# Patient Record
Sex: MALE | Race: BLACK OR AFRICAN AMERICAN | NOT HISPANIC OR LATINO | Employment: UNEMPLOYED | URBAN - NONMETROPOLITAN AREA
[De-identification: names, ages, dates, MRNs, and addresses within clinical notes are randomized per-mention and may not be internally consistent; named-entity substitution may affect disease eponyms.]

---

## 2023-08-28 ENCOUNTER — HOSPITAL ENCOUNTER (EMERGENCY)
Facility: HOSPITAL | Age: 63
Discharge: HOME | End: 2023-08-28
Attending: STUDENT IN AN ORGANIZED HEALTH CARE EDUCATION/TRAINING PROGRAM | Admitting: STUDENT IN AN ORGANIZED HEALTH CARE EDUCATION/TRAINING PROGRAM
Payer: MEDICARE

## 2023-08-28 ENCOUNTER — APPOINTMENT (OUTPATIENT)
Dept: CT IMAGING | Facility: HOSPITAL | Age: 63
End: 2023-08-28
Payer: MEDICARE

## 2023-08-28 VITALS
SYSTOLIC BLOOD PRESSURE: 104 MMHG | HEART RATE: 84 BPM | WEIGHT: 145 LBS | BODY MASS INDEX: 22.76 KG/M2 | TEMPERATURE: 99.8 F | HEIGHT: 67 IN | RESPIRATION RATE: 18 BRPM | OXYGEN SATURATION: 98 % | DIASTOLIC BLOOD PRESSURE: 59 MMHG

## 2023-08-28 DIAGNOSIS — J18.9 PNEUMONIA DUE TO INFECTIOUS ORGANISM, UNSPECIFIED LATERALITY, UNSPECIFIED PART OF LUNG: Primary | ICD-10-CM

## 2023-08-28 LAB
ABO GROUP BLD: NORMAL
ABO GROUP BLD: NORMAL
ALBUMIN SERPL-MCNC: 3.1 G/DL (ref 3.5–5.2)
ALBUMIN/GLOB SERPL: 0.6 G/DL
ALP SERPL-CCNC: 127 U/L (ref 39–117)
ALT SERPL W P-5'-P-CCNC: 33 U/L (ref 1–41)
ANION GAP SERPL CALCULATED.3IONS-SCNC: 13.5 MMOL/L (ref 5–15)
AST SERPL-CCNC: 29 U/L (ref 1–40)
BACTERIA UR QL AUTO: ABNORMAL /HPF
BILIRUB SERPL-MCNC: 0.6 MG/DL (ref 0–1.2)
BILIRUB UR QL STRIP: ABNORMAL
BLD GP AB SCN SERPL QL: NEGATIVE
BUN SERPL-MCNC: 7 MG/DL (ref 8–23)
BUN/CREAT SERPL: 9.9 (ref 7–25)
CALCIUM SPEC-SCNC: 8.4 MG/DL (ref 8.6–10.5)
CHLORIDE SERPL-SCNC: 90 MMOL/L (ref 98–107)
CLARITY UR: CLEAR
CO2 SERPL-SCNC: 24.5 MMOL/L (ref 22–29)
COLOR UR: ABNORMAL
CREAT SERPL-MCNC: 0.71 MG/DL (ref 0.76–1.27)
CRP SERPL-MCNC: 27.76 MG/DL (ref 0–0.5)
D-LACTATE SERPL-SCNC: 1 MMOL/L (ref 0.5–2)
DEPRECATED RDW RBC AUTO: 43.7 FL (ref 37–54)
EGFRCR SERPLBLD CKD-EPI 2021: 105 ML/MIN/1.73
ERYTHROCYTE [DISTWIDTH] IN BLOOD BY AUTOMATED COUNT: 14.6 % (ref 12.3–15.4)
ERYTHROCYTE [SEDIMENTATION RATE] IN BLOOD: >130 MM/HR (ref 0–20)
FLUAV RNA RESP QL NAA+PROBE: NOT DETECTED
FLUBV RNA RESP QL NAA+PROBE: NOT DETECTED
GLOBULIN UR ELPH-MCNC: 4.9 GM/DL
GLUCOSE SERPL-MCNC: 100 MG/DL (ref 65–99)
GLUCOSE UR STRIP-MCNC: NEGATIVE MG/DL
HCT VFR BLD AUTO: 40 % (ref 37.5–51)
HETEROPH AB SER QL LA: NEGATIVE
HGB BLD-MCNC: 13.3 G/DL (ref 13–17.7)
HGB UR QL STRIP.AUTO: ABNORMAL
HOLD SPECIMEN: NORMAL
HOLD SPECIMEN: NORMAL
HYALINE CASTS UR QL AUTO: ABNORMAL /LPF
KETONES UR QL STRIP: ABNORMAL
LEUKOCYTE ESTERASE UR QL STRIP.AUTO: ABNORMAL
LYMPHOCYTES # BLD MANUAL: 2.17 10*3/MM3 (ref 0.7–3.1)
LYMPHOCYTES NFR BLD MANUAL: 23 % (ref 5–12)
MAGNESIUM SERPL-MCNC: 1.7 MG/DL (ref 1.6–2.4)
MCH RBC QN AUTO: 27.4 PG (ref 26.6–33)
MCHC RBC AUTO-ENTMCNC: 33.3 G/DL (ref 31.5–35.7)
MCV RBC AUTO: 82.3 FL (ref 79–97)
METAMYELOCYTES NFR BLD MANUAL: 2 % (ref 0–0)
MONOCYTES # BLD: 3.33 10*3/MM3 (ref 0.1–0.9)
NEUTROPHILS # BLD AUTO: 8.69 10*3/MM3 (ref 1.7–7)
NEUTROPHILS NFR BLD MANUAL: 57 % (ref 42.7–76)
NEUTS BAND NFR BLD MANUAL: 3 % (ref 0–5)
NITRITE UR QL STRIP: NEGATIVE
PH UR STRIP.AUTO: 8.5 [PH] (ref 5–8)
PLAT MORPH BLD: NORMAL
PLATELET # BLD AUTO: 290 10*3/MM3 (ref 140–450)
PMV BLD AUTO: 9.4 FL (ref 6–12)
POTASSIUM SERPL-SCNC: 3 MMOL/L (ref 3.5–5.2)
PROT SERPL-MCNC: 8 G/DL (ref 6–8.5)
PROT UR QL STRIP: ABNORMAL
RBC # BLD AUTO: 4.86 10*6/MM3 (ref 4.14–5.8)
RBC # UR STRIP: ABNORMAL /HPF
RBC MORPH BLD: NORMAL
REF LAB TEST METHOD: ABNORMAL
RH BLD: POSITIVE
RH BLD: POSITIVE
SARS-COV-2 RNA RESP QL NAA+PROBE: NOT DETECTED
SODIUM SERPL-SCNC: 128 MMOL/L (ref 136–145)
SP GR UR STRIP: 1.02 (ref 1–1.03)
SQUAMOUS #/AREA URNS HPF: ABNORMAL /HPF
T&S EXPIRATION DATE: NORMAL
UROBILINOGEN UR QL STRIP: ABNORMAL
VARIANT LYMPHS NFR BLD MANUAL: 15 % (ref 19.6–45.3)
WBC # UR STRIP: ABNORMAL /HPF
WBC NRBC COR # BLD: 14.48 10*3/MM3 (ref 3.4–10.8)
WHOLE BLOOD HOLD COAG: NORMAL
WHOLE BLOOD HOLD SPECIMEN: NORMAL

## 2023-08-28 PROCEDURE — 96365 THER/PROPH/DIAG IV INF INIT: CPT

## 2023-08-28 PROCEDURE — 83735 ASSAY OF MAGNESIUM: CPT | Performed by: PHYSICIAN ASSISTANT

## 2023-08-28 PROCEDURE — 85025 COMPLETE CBC W/AUTO DIFF WBC: CPT | Performed by: PHYSICIAN ASSISTANT

## 2023-08-28 PROCEDURE — 86140 C-REACTIVE PROTEIN: CPT | Performed by: PHYSICIAN ASSISTANT

## 2023-08-28 PROCEDURE — 80053 COMPREHEN METABOLIC PANEL: CPT | Performed by: PHYSICIAN ASSISTANT

## 2023-08-28 PROCEDURE — 86900 BLOOD TYPING SEROLOGIC ABO: CPT

## 2023-08-28 PROCEDURE — 86850 RBC ANTIBODY SCREEN: CPT | Performed by: PHYSICIAN ASSISTANT

## 2023-08-28 PROCEDURE — 36415 COLL VENOUS BLD VENIPUNCTURE: CPT

## 2023-08-28 PROCEDURE — 86900 BLOOD TYPING SEROLOGIC ABO: CPT | Performed by: PHYSICIAN ASSISTANT

## 2023-08-28 PROCEDURE — 85007 BL SMEAR W/DIFF WBC COUNT: CPT | Performed by: PHYSICIAN ASSISTANT

## 2023-08-28 PROCEDURE — 71250 CT THORAX DX C-: CPT

## 2023-08-28 PROCEDURE — 87040 BLOOD CULTURE FOR BACTERIA: CPT | Performed by: PHYSICIAN ASSISTANT

## 2023-08-28 PROCEDURE — 86308 HETEROPHILE ANTIBODY SCREEN: CPT | Performed by: STUDENT IN AN ORGANIZED HEALTH CARE EDUCATION/TRAINING PROGRAM

## 2023-08-28 PROCEDURE — 85652 RBC SED RATE AUTOMATED: CPT | Performed by: PHYSICIAN ASSISTANT

## 2023-08-28 PROCEDURE — 87636 SARSCOV2 & INF A&B AMP PRB: CPT | Performed by: PHYSICIAN ASSISTANT

## 2023-08-28 PROCEDURE — 25010000002 HYDROMORPHONE 1 MG/ML SOLUTION: Performed by: STUDENT IN AN ORGANIZED HEALTH CARE EDUCATION/TRAINING PROGRAM

## 2023-08-28 PROCEDURE — 81001 URINALYSIS AUTO W/SCOPE: CPT | Performed by: PHYSICIAN ASSISTANT

## 2023-08-28 PROCEDURE — 25010000002 ONDANSETRON PER 1 MG: Performed by: STUDENT IN AN ORGANIZED HEALTH CARE EDUCATION/TRAINING PROGRAM

## 2023-08-28 PROCEDURE — 71250 CT THORAX DX C-: CPT | Performed by: RADIOLOGY

## 2023-08-28 PROCEDURE — 86901 BLOOD TYPING SEROLOGIC RH(D): CPT | Performed by: PHYSICIAN ASSISTANT

## 2023-08-28 PROCEDURE — 83605 ASSAY OF LACTIC ACID: CPT | Performed by: PHYSICIAN ASSISTANT

## 2023-08-28 PROCEDURE — 25510000001 IOPAMIDOL 61 % SOLUTION: Performed by: STUDENT IN AN ORGANIZED HEALTH CARE EDUCATION/TRAINING PROGRAM

## 2023-08-28 PROCEDURE — 74177 CT ABD & PELVIS W/CONTRAST: CPT | Performed by: RADIOLOGY

## 2023-08-28 PROCEDURE — 96375 TX/PRO/DX INJ NEW DRUG ADDON: CPT

## 2023-08-28 PROCEDURE — 74177 CT ABD & PELVIS W/CONTRAST: CPT

## 2023-08-28 PROCEDURE — 25010000002 MORPHINE PER 10 MG: Performed by: STUDENT IN AN ORGANIZED HEALTH CARE EDUCATION/TRAINING PROGRAM

## 2023-08-28 PROCEDURE — 25010000002 PIPERACILLIN SOD-TAZOBACTAM PER 1 G: Performed by: PHYSICIAN ASSISTANT

## 2023-08-28 PROCEDURE — 86901 BLOOD TYPING SEROLOGIC RH(D): CPT

## 2023-08-28 PROCEDURE — 99285 EMERGENCY DEPT VISIT HI MDM: CPT

## 2023-08-28 RX ORDER — PREDNISONE 20 MG/1
20 TABLET ORAL DAILY
Qty: 5 TABLET | Refills: 0 | Status: SHIPPED | OUTPATIENT
Start: 2023-08-28 | End: 2023-09-02

## 2023-08-28 RX ORDER — AMOXICILLIN AND CLAVULANATE POTASSIUM 875; 125 MG/1; MG/1
1 TABLET, FILM COATED ORAL EVERY 12 HOURS
Qty: 14 TABLET | Refills: 0 | Status: SHIPPED | OUTPATIENT
Start: 2023-08-28 | End: 2023-09-04

## 2023-08-28 RX ORDER — DOXYCYCLINE HYCLATE 100 MG/1
100 CAPSULE ORAL 2 TIMES DAILY
Qty: 14 CAPSULE | Refills: 0 | Status: SHIPPED | OUTPATIENT
Start: 2023-08-28 | End: 2023-09-04

## 2023-08-28 RX ORDER — ONDANSETRON 2 MG/ML
4 INJECTION INTRAMUSCULAR; INTRAVENOUS ONCE
Status: COMPLETED | OUTPATIENT
Start: 2023-08-28 | End: 2023-08-28

## 2023-08-28 RX ADMIN — HYDROMORPHONE HYDROCHLORIDE 1 MG: 1 INJECTION, SOLUTION INTRAMUSCULAR; INTRAVENOUS; SUBCUTANEOUS at 18:04

## 2023-08-28 RX ADMIN — IOPAMIDOL 100 ML: 612 INJECTION, SOLUTION INTRAVENOUS at 17:50

## 2023-08-28 RX ADMIN — SODIUM CHLORIDE 1000 ML: 9 INJECTION, SOLUTION INTRAVENOUS at 18:03

## 2023-08-28 RX ADMIN — PIPERACILLIN SODIUM AND TAZOBACTAM SODIUM 3.38 G: 3; .375 INJECTION, POWDER, LYOPHILIZED, FOR SOLUTION INTRAVENOUS at 18:07

## 2023-08-28 RX ADMIN — ONDANSETRON 4 MG: 2 INJECTION INTRAMUSCULAR; INTRAVENOUS at 16:45

## 2023-08-28 RX ADMIN — MORPHINE SULFATE 4 MG: 4 INJECTION, SOLUTION INTRAMUSCULAR; INTRAVENOUS at 16:46

## 2023-08-28 NOTE — ED PROVIDER NOTES
Subjective   History of Present Illness  62-year-old male presents secondary to right-sided abdominal pain predominantly in right lower quadrant.  Patient states this started first and then subsequently developed nausea vomiting and diarrhea.  He states he has had symptoms since last Wednesday.  His appetite has been poor.  He is progressively been getting weaker and feels lightheaded.  He states that his diarrhea looks dark.  He denies any bright red blood per rectum.  No vomiting up of blood.  He has no past medical problems.  He presents by private vehicle.    Review of Systems   Constitutional: Negative.  Negative for fever.   HENT: Negative.     Respiratory: Negative.     Cardiovascular: Negative.  Negative for chest pain.   Gastrointestinal:  Positive for abdominal pain, diarrhea, nausea and vomiting.   Endocrine: Negative.    Genitourinary: Negative.  Negative for dysuria.   Skin: Negative.    Neurological: Negative.    Psychiatric/Behavioral: Negative.     All other systems reviewed and are negative.    No past medical history on file.    No Known Allergies    No past surgical history on file.    No family history on file.    Social History     Socioeconomic History    Marital status: Single           Objective   Physical Exam  Vitals and nursing note reviewed.   Constitutional:       General: He is not in acute distress.     Appearance: He is well-developed. He is not diaphoretic.   HENT:      Head: Normocephalic and atraumatic.      Right Ear: External ear normal.      Left Ear: External ear normal.      Nose: Nose normal.   Eyes:      Conjunctiva/sclera: Conjunctivae normal.      Pupils: Pupils are equal, round, and reactive to light.   Neck:      Vascular: No JVD.      Trachea: No tracheal deviation.   Cardiovascular:      Rate and Rhythm: Normal rate and regular rhythm.      Heart sounds: Normal heart sounds. No murmur heard.  Pulmonary:      Effort: Pulmonary effort is normal. No respiratory distress.       Breath sounds: Normal breath sounds. No wheezing.   Abdominal:      General: Bowel sounds are normal.      Palpations: Abdomen is soft.      Tenderness: There is no abdominal tenderness.   Musculoskeletal:         General: No deformity. Normal range of motion.      Cervical back: Normal range of motion and neck supple.   Skin:     General: Skin is warm and dry.      Coloration: Skin is not pale.      Findings: No erythema or rash.   Neurological:      Mental Status: He is alert and oriented to person, place, and time.      Cranial Nerves: No cranial nerve deficit.   Psychiatric:         Behavior: Behavior normal.         Thought Content: Thought content normal.       Procedures           ED Course           Results for orders placed or performed during the hospital encounter of 08/28/23   COVID-19 and FLU A/B PCR - Swab, Nasopharynx    Specimen: Nasopharynx; Swab   Result Value Ref Range    COVID19 Not Detected Not Detected - Ref. Range    Influenza A PCR Not Detected Not Detected    Influenza B PCR Not Detected Not Detected   Comprehensive Metabolic Panel    Specimen: Arm, Left; Blood   Result Value Ref Range    Glucose 100 (H) 65 - 99 mg/dL    BUN 7 (L) 8 - 23 mg/dL    Creatinine 0.71 (L) 0.76 - 1.27 mg/dL    Sodium 128 (L) 136 - 145 mmol/L    Potassium 3.0 (L) 3.5 - 5.2 mmol/L    Chloride 90 (L) 98 - 107 mmol/L    CO2 24.5 22.0 - 29.0 mmol/L    Calcium 8.4 (L) 8.6 - 10.5 mg/dL    Total Protein 8.0 6.0 - 8.5 g/dL    Albumin 3.1 (L) 3.5 - 5.2 g/dL    ALT (SGPT) 33 1 - 41 U/L    AST (SGOT) 29 1 - 40 U/L    Alkaline Phosphatase 127 (H) 39 - 117 U/L    Total Bilirubin 0.6 0.0 - 1.2 mg/dL    Globulin 4.9 gm/dL    A/G Ratio 0.6 g/dL    BUN/Creatinine Ratio 9.9 7.0 - 25.0    Anion Gap 13.5 5.0 - 15.0 mmol/L    eGFR 105.0 >60.0 mL/min/1.73   Urinalysis With Culture If Indicated - Urine, Clean Catch    Specimen: Urine, Clean Catch   Result Value Ref Range    Color, UA Dark Yellow (A) Yellow, Straw    Appearance, UA  Clear Clear    pH, UA 8.5 (H) 5.0 - 8.0    Specific Gravity, UA 1.023 1.005 - 1.030    Glucose, UA Negative Negative    Ketones, UA Trace (A) Negative    Bilirubin, UA Small (1+) (A) Negative    Blood, UA Large (3+) (A) Negative    Protein, UA >=300 mg/dL (3+) (A) Negative    Leuk Esterase, UA Small (1+) (A) Negative    Nitrite, UA Negative Negative    Urobilinogen, UA 4.0 E.U./dL (A) 0.2 - 1.0 E.U./dL   C-reactive Protein    Specimen: Arm, Left; Blood   Result Value Ref Range    C-Reactive Protein 27.76 (H) 0.00 - 0.50 mg/dL   Sedimentation Rate    Specimen: Arm, Left; Blood   Result Value Ref Range    Sed Rate >130 (H) 0 - 20 mm/hr   CBC Auto Differential    Specimen: Arm, Left; Blood   Result Value Ref Range    WBC 14.48 (H) 3.40 - 10.80 10*3/mm3    RBC 4.86 4.14 - 5.80 10*6/mm3    Hemoglobin 13.3 13.0 - 17.7 g/dL    Hematocrit 40.0 37.5 - 51.0 %    MCV 82.3 79.0 - 97.0 fL    MCH 27.4 26.6 - 33.0 pg    MCHC 33.3 31.5 - 35.7 g/dL    RDW 14.6 12.3 - 15.4 %    RDW-SD 43.7 37.0 - 54.0 fl    MPV 9.4 6.0 - 12.0 fL    Platelets 290 140 - 450 10*3/mm3   Urinalysis, Microscopic Only - Urine, Clean Catch    Specimen: Urine, Clean Catch   Result Value Ref Range    RBC, UA 13-20 (A) None Seen, 0-2 /HPF    WBC, UA 0-2 None Seen, 0-2 /HPF    Bacteria, UA None Seen None Seen /HPF    Squamous Epithelial Cells, UA 0-2 None Seen, 0-2 /HPF    Hyaline Casts, UA 3-6 None Seen /LPF    Methodology Manual Light Microscopy    Manual Differential    Specimen: Arm, Left; Blood   Result Value Ref Range    Neutrophil % 57.0 42.7 - 76.0 %    Lymphocyte % 15.0 (L) 19.6 - 45.3 %    Monocyte % 23.0 (H) 5.0 - 12.0 %    Bands %  3.0 0.0 - 5.0 %    Metamyelocyte % 2.0 (H) 0.0 - 0.0 %    Neutrophils Absolute 8.69 (H) 1.70 - 7.00 10*3/mm3    Lymphocytes Absolute 2.17 0.70 - 3.10 10*3/mm3    Monocytes Absolute 3.33 (H) 0.10 - 0.90 10*3/mm3    RBC Morphology Normal Normal    Platelet Morphology Normal Normal   Magnesium    Specimen: Arm, Left; Blood    Result Value Ref Range    Magnesium 1.7 1.6 - 2.4 mg/dL   Lactic Acid, Plasma    Specimen: Blood   Result Value Ref Range    Lactate 1.0 0.5 - 2.0 mmol/L   Mononucleosis Screen    Specimen: Arm, Left; Blood   Result Value Ref Range    Monospot Negative Negative   Type & Screen    Specimen: Arm, Left; Blood   Result Value Ref Range    ABO Type O     RH type Positive     Antibody Screen Negative     T&S Expiration Date 8/31/2023 11:59:59 PM    ABO RH Specimen Verification    Specimen: Blood   Result Value Ref Range    ABO Type O     RH type Positive    Green Top (Gel)   Result Value Ref Range    Extra Tube Hold for add-ons.    Lavender Top   Result Value Ref Range    Extra Tube hold for add-on    Gold Top - SST   Result Value Ref Range    Extra Tube Hold for add-ons.    Light Blue Top   Result Value Ref Range    Extra Tube Hold for add-ons.                              Electronically signed by MAICOL Robertson, 08/28/23, 7:43 PM EDT.            Medical Decision Making  62-year-old male presents secondary to right-sided abdominal pain predominantly in right lower quadrant.  Patient states this started first and then subsequently developed nausea vomiting and diarrhea.  He states he has had symptoms since last Wednesday.  His appetite has been poor.  He is progressively been getting weaker and feels lightheaded.  He states that his diarrhea looks dark.  He denies any bright red blood per rectum.  No vomiting up of blood.  He has no past medical problems.  He presents by private vehicle.    -----  Handoff received.  Right lower quadrant pain is likely due to pneumonia noted on imaging.  Will DC with p.o. antibiotics, short course of steroids, follow-up with PCP    Problems Addressed:  Pneumonia due to infectious organism, unspecified laterality, unspecified part of lung: complicated acute illness or injury    Amount and/or Complexity of Data Reviewed  Labs: ordered. Decision-making details documented in ED  Course.  Radiology: ordered. Decision-making details documented in ED Course.    Risk  Prescription drug management.        Final diagnoses:   Pneumonia due to infectious organism, unspecified laterality, unspecified part of lung       ED Disposition  ED Disposition       ED Disposition   Discharge    Condition   Stable    Comment   --               Provider, No Known  [facility]  [place] [address]  [phone]    In 1 week           Medication List        New Prescriptions      amoxicillin-clavulanate 875-125 MG per tablet  Commonly known as: AUGMENTIN  Take 1 tablet by mouth Every 12 (Twelve) Hours for 7 days.     doxycycline 100 MG capsule  Commonly known as: VIBRAMYCIN  Take 1 capsule by mouth 2 (Two) Times a Day for 7 days.     predniSONE 20 MG tablet  Commonly known as: DELTASONE  Take 1 tablet by mouth Daily for 5 days.               Where to Get Your Medications        [facility] [address] - [phone]  - [phone]   [address], [facility] [address]      Phone: [phone]   amoxicillin-clavulanate 875-125 MG per tablet  doxycycline 100 MG capsule  predniSONE 20 MG tablet            Cas Gutierrez,   08/28/23 [id number]

## 2023-08-29 NOTE — DISCHARGE INSTRUCTIONS
Call one of the offices below to establish a primary care provider.  If you are unable to get an appointment and feel it is an emergency and need to be seen immediately please return to the Emergency Department.    Call one of the office below to set up a primary care provider.    Dr. Evan Tello                                                                                                       [address] [facility] [address]  [phone]    Dr. Mckeon, Dr. ISABEL Tyler, Dr. DEMETRIA Tyler ([facility])  [address] [facility] [address]  [phone]    Dr. Spaulding, Dr. Juan, Dr. Art ([facility])  [address] [facility] [address]  [phone]    Dr. Krause  [address] [facility] [address]  [phone]    Dr. Brice, Dr. Hughes, Dr. Davis, Dr. Casiano ([facility])  [address] [place] [address] [address]  [facility] [address]  [phone]    Dr. Crystal Yi  [address] [facility] [place] [address]  [phone]    Dr. Katerin Villa  [address]   [address]  [facility] [address]  [phone]    Dr. Tello  [address] [facility] [address]  [phone]    Dr. Koch, Dr. Chamorro  [address] [facility] [place] [address]  [phone]    Dr. Hollis  [address]                                                              [address]  [facility] [address]  [phone]    Dr. Gutierrez  [address] [facility] [address]  [phone]    Dr. Cristina Lomas  [address]  [address]  [place] [address]  [phone]    Dr. Llamas and [facility]   [address] [facility]  [address]  [place] [address]  [phone]

## 2023-08-29 NOTE — ED NOTES
Pt requesting to leave, asking if his results can be delivered by phone after he leaves. Dr. Gutierrez is aware.

## 2023-09-02 LAB
BACTERIA SPEC AEROBE CULT: NORMAL
BACTERIA SPEC AEROBE CULT: NORMAL